# Patient Record
Sex: MALE | Race: WHITE
[De-identification: names, ages, dates, MRNs, and addresses within clinical notes are randomized per-mention and may not be internally consistent; named-entity substitution may affect disease eponyms.]

---

## 2019-11-12 ENCOUNTER — HOSPITAL ENCOUNTER (EMERGENCY)
Dept: HOSPITAL 25 - UCEAST | Age: 54
Discharge: HOME | End: 2019-11-12
Payer: COMMERCIAL

## 2019-11-12 VITALS — DIASTOLIC BLOOD PRESSURE: 68 MMHG | SYSTOLIC BLOOD PRESSURE: 121 MMHG

## 2019-11-12 DIAGNOSIS — S51.812A: Primary | ICD-10-CM

## 2019-11-12 DIAGNOSIS — F17.290: ICD-10-CM
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
## 2019-11-12 DIAGNOSIS — Z88.6: ICD-10-CM

## 2019-11-12 PROCEDURE — 12002 RPR S/N/AX/GEN/TRNK2.6-7.5CM: CPT

## 2019-11-12 PROCEDURE — 90471 IMMUNIZATION ADMIN: CPT

## 2019-11-12 PROCEDURE — G0463 HOSPITAL OUTPT CLINIC VISIT: HCPCS

## 2019-11-12 PROCEDURE — 99201: CPT

## 2019-11-12 PROCEDURE — 90715 TDAP VACCINE 7 YRS/> IM: CPT

## 2019-11-12 NOTE — UC
Laceration HPI





- HPI Summary


HPI Summary: 





55 yo male presents with LEFT forearm laceration. He tells me that about 20min 

PTA he was using a  and it slipped and he sustained a laceration to his 

left forearm. He bandaged the area and came to . Thinks date of last tetanus 

was 15 years ago. 





- History Of Current Complaint


Chief Complaint: UCLaceration


Stated Complaint: ARM LAC


Time Seen by Provider: 11/12/19 16:35


Hx Obtained From: Patient


Laceration Location: Arm


Mechanism Of Injury: Sharp Trauma


Pain Intensity: 3





- Allergies/Home Medications


Allergies/Adverse Reactions: 


 Allergies











Allergy/AdvReac Type Severity Reaction Status Date / Time


 


acetaminophen [From Tylenol] Allergy  Difficulty Verified 11/12/19 16:28





   Breathing  











Home Medications: 


 Home Medications





NK [No Home Medications Reported]  11/12/19 [History Confirmed 11/12/19]











PMH/Surg Hx/FS Hx/Imm Hx





- Additional Past Medical History


Additional PMH: 





None





- Surgical History


Surgical History: None





- Family History


Known Family History: Positive: Hypertension





- Social History


Occupation: Employed Full-time


Lives: With Family


Alcohol Use: Occasionally


Substance Use Type: None


Smoking Status (MU): Heavy Every Day Tobacco Smoker





Review of Systems


All Other Systems Reviewed And Are Negative: No


Constitutional: Positive: Negative


Skin: Positive: Other - Left forearm laceration


Respiratory: Positive: Negative


Cardiovascular: Positive: Negative


Musculoskeletal: Positive: Negative


Neurological: Positive: Negative


Psychological: Positive: Negative





Physical Exam





- Summary


Physical Exam Summary: 





 


GENERAL: NAD. WDWN. No pain distress.


SKIN: LEFT FOREARM: dorsal radial aspect with linear 2.6cm partial thickness 

laceration with mild bleeding. Clean appearing. No tendon or bony involvement.


CHEST:  No accessory muscle use. Breathing comfortably and in no distress.


CV:  Pulses intact. Cap refill <2seconds


NEURO: Alert.


PSYCH: Age appropriate behavior.





Triage Information Reviewed: Yes


Vital Signs: 


 Initial Vital Signs











Temp  97.8 F   11/12/19 16:29


 


Pulse  66   11/12/19 16:29


 


Resp  18   11/12/19 16:29


 


BP  121/68   11/12/19 16:29


 


Pulse Ox  98   11/12/19 16:29











Vital Signs Reviewed: Yes





Laceration Repair





- Laceration Repair


  ** 1


Description: Linear


Laceration Size After Repair: Length (cm) - 2.6


Anesthesia Used: 2.0% Lido


Irrigation With Pressure Irrigation Device: Yes


Closure Material: Sutures


Closure Method: Single Layer


Suture Of: Skin


Suture Type: Prolene





Laceration Course/Dx





- Course/Dx


Course Of Treatment: 





The procedure was explained to the pt and all questions were answered. A time 

out was performed, witnessed, and signed. The area was irrigated with 20mL 

sterile saline. 2mL of 2% lidocaine without epi was administered and good 

anesthetization was achieved. In the usual sterile fashion, FIVE 4-0 prolene 

interrupted sutures were placed. Homeostasis achieved. The wound was bandaged 

with telfa . Pt tolerated procedure well.





tdap updated today





- Diagnosis


Provider Diagnosis: 


 Forearm laceration








Discharge ED





- Sign-Out/Discharge


Documenting (check all that apply): Patient Departure


All imaging exams completed and their final reports reviewed: No Studies





- Discharge Plan


Condition: Stable


Disposition: HOME


Patient Education Materials:  Care For Your Stitches (ED), Laceration (ED)


Referrals: 


No Primary Care Phys,NOPCP [Primary Care Provider] - 


Additional Instructions: 


1) Please keep the area bandage, clean, dry, and intact for the next 24-

48hours. Then change the bandage daily until sutures are removed.


2) If you develop a fever, colored or thick discharge, increased pain or 

swelling - please call your PCP or return for a wound check.


3) Please return in 9-10 days to have your FIVE sutures removed.








- Billing Disposition and Condition


Condition: STABLE


Disposition: Home